# Patient Record
Sex: MALE | Race: WHITE | NOT HISPANIC OR LATINO | URBAN - METROPOLITAN AREA
[De-identification: names, ages, dates, MRNs, and addresses within clinical notes are randomized per-mention and may not be internally consistent; named-entity substitution may affect disease eponyms.]

---

## 2017-05-02 ENCOUNTER — EMERGENCY (EMERGENCY)
Facility: HOSPITAL | Age: 47
LOS: 1 days | Discharge: HOME | End: 2017-05-02
Attending: EMERGENCY MEDICINE | Admitting: EMERGENCY MEDICINE
Payer: SELF-PAY

## 2017-05-02 VITALS
OXYGEN SATURATION: 95 % | RESPIRATION RATE: 18 BRPM | DIASTOLIC BLOOD PRESSURE: 77 MMHG | TEMPERATURE: 97 F | SYSTOLIC BLOOD PRESSURE: 126 MMHG | HEART RATE: 92 BPM

## 2017-05-02 DIAGNOSIS — M79.661 PAIN IN RIGHT LOWER LEG: ICD-10-CM

## 2017-05-02 DIAGNOSIS — M79.662 PAIN IN LEFT LOWER LEG: ICD-10-CM

## 2017-05-02 DIAGNOSIS — F10.129 ALCOHOL ABUSE WITH INTOXICATION, UNSPECIFIED: ICD-10-CM

## 2017-05-02 DIAGNOSIS — R41.82 ALTERED MENTAL STATUS, UNSPECIFIED: ICD-10-CM

## 2017-05-02 PROCEDURE — 99283 EMERGENCY DEPT VISIT LOW MDM: CPT

## 2017-05-02 NOTE — ED PROVIDER NOTE - MEDICAL DECISION MAKING DETAILS
Alcohol intoxication, no signs of trauma. Alcohol intoxication, no signs of trauma.  Observed pt and re-evaluate.

## 2017-05-02 NOTE — ED PROVIDER NOTE - NEUROLOGICAL, MLM
Alert and oriented, no focal deficits, no motor or sensory deficits. Moves all four extremities equally.

## 2017-05-02 NOTE — ED ADULT TRIAGE NOTE - CHIEF COMPLAINT QUOTE
Pt. brought in from [facility] reports having bilateral leg pain [id number],. pt. reports he is unable to walk.

## 2017-05-02 NOTE — ED PROVIDER NOTE - OBJECTIVE STATEMENT
47y M Pt MICHAEL from [facility] for [facility]. Pt is fully alert and admits to daily ETOH use including today. Pt c/o bilateral leg pain from being on his feet too much and requests to put feet up at this time. 47y M Pt MICHAEL from [facility] for [facility]. Pt is fully alert and admits to daily ETOH use including today. Pt c/o bilateral leg pain from being on his feet too much and requests to put feet up at this time.  Denies any trauma, assault, or other complaints.

## 2017-05-02 NOTE — ED PROVIDER NOTE - NS ED MD SCRIBE ATTENDING SCRIBE SECTIONS
PHYSICAL EXAM/REVIEW OF SYSTEMS/PAST MEDICAL/SURGICAL/SOCIAL HISTORY/HISTORY OF PRESENT ILLNESS/HIV/VITAL SIGNS( Pullset)/INTAKE ASSESSMENT/SCREENINGS

## 2017-05-02 NOTE — ED PROVIDER NOTE - PROGRESS NOTE DETAILS
Pt is requesting discharge at this time and is ready to go. Pt is requesting discharge at this time and is ready to go.  Walks with steady gait and is still A&O x 3.  Repeat exam is benign.

## 2017-09-16 ENCOUNTER — EMERGENCY (EMERGENCY)
Facility: HOSPITAL | Age: 47
LOS: 1 days | Discharge: HOME | End: 2017-09-16
Admitting: EMERGENCY MEDICINE
Payer: MEDICAID

## 2017-09-16 VITALS
HEART RATE: 75 BPM | OXYGEN SATURATION: 94 % | RESPIRATION RATE: 18 BRPM | TEMPERATURE: 98 F | DIASTOLIC BLOOD PRESSURE: 58 MMHG | SYSTOLIC BLOOD PRESSURE: 98 MMHG

## 2017-09-16 DIAGNOSIS — F17.200 NICOTINE DEPENDENCE, UNSPECIFIED, UNCOMPLICATED: ICD-10-CM

## 2017-09-16 DIAGNOSIS — M79.661 PAIN IN RIGHT LOWER LEG: ICD-10-CM

## 2017-09-16 DIAGNOSIS — M79.672 PAIN IN LEFT FOOT: ICD-10-CM

## 2017-09-16 DIAGNOSIS — M79.671 PAIN IN RIGHT FOOT: ICD-10-CM

## 2017-09-16 PROCEDURE — 99283 EMERGENCY DEPT VISIT LOW MDM: CPT | Mod: 25

## 2017-09-16 RX ORDER — KETOROLAC TROMETHAMINE 30 MG/ML
30 SYRINGE (ML) INJECTION ONCE
Refills: 0 | Status: DISCONTINUED | OUTPATIENT
Start: 2017-09-16 | End: 2017-09-16

## 2017-09-16 RX ADMIN — Medication 30 MILLIGRAM(S): at 23:45

## 2017-09-16 NOTE — ED PROVIDER NOTE - MUSCULOSKELETAL, MLM
rt /lt foot + callouses, no swelling, symmetrical , no Ow, no deformity, + pulse, no  erythema, not warm to touch, Spine appears normal, range of motion is not limited, no muscle or joint tenderness

## 2017-09-16 NOTE — ED PROVIDER NOTE - CHPI ED SYMPTOMS NEG
no abrasion/no back pain/no deformity/no fever/no numbness/no stiffness/no tingling/no weakness/no bruising/no difficulty bearing weight

## 2017-09-16 NOTE — ED PROVIDER NOTE - OBJECTIVE STATEMENT
pt. h/o polysubstance abuse, homeless, presenting with b/l pain to  feet, pt. states has callouses and need them to be removed. states causing him pain when walking. no fever/chills, no swelling.

## 2017-09-16 NOTE — ED ADULT NURSE NOTE - OBJECTIVE STATEMENT
Pt. reports having bilateral leg pain after walking for too long, pt. also requesting a sandwich on assessment.

## 2018-02-11 ENCOUNTER — EMERGENCY (EMERGENCY)
Facility: HOSPITAL | Age: 48
LOS: 1 days | Discharge: HOME | End: 2018-02-11
Attending: EMERGENCY MEDICINE | Admitting: EMERGENCY MEDICINE
Payer: MEDICAID

## 2018-02-11 VITALS
DIASTOLIC BLOOD PRESSURE: 62 MMHG | OXYGEN SATURATION: 99 % | TEMPERATURE: 98 F | RESPIRATION RATE: 16 BRPM | HEART RATE: 78 BPM | SYSTOLIC BLOOD PRESSURE: 102 MMHG

## 2018-02-11 VITALS
DIASTOLIC BLOOD PRESSURE: 65 MMHG | RESPIRATION RATE: 16 BRPM | HEART RATE: 65 BPM | OXYGEN SATURATION: 97 % | TEMPERATURE: 98 F | SYSTOLIC BLOOD PRESSURE: 100 MMHG

## 2018-02-11 DIAGNOSIS — E11.65 TYPE 2 DIABETES MELLITUS WITH HYPERGLYCEMIA: ICD-10-CM

## 2018-02-11 DIAGNOSIS — R73.9 HYPERGLYCEMIA, UNSPECIFIED: ICD-10-CM

## 2018-02-11 LAB
ALBUMIN SERPL ELPH-MCNC: 3.3 G/DL — LOW (ref 3.4–5)
ALP SERPL-CCNC: 156 U/L — HIGH (ref 40–120)
ALT FLD-CCNC: 45 U/L — HIGH (ref 12–42)
AMPHET UR-MCNC: NEGATIVE — SIGNIFICANT CHANGE UP
ANION GAP SERPL CALC-SCNC: 7 MMOL/L — LOW (ref 9–16)
ANISOCYTOSIS BLD QL: SLIGHT — SIGNIFICANT CHANGE UP
APPEARANCE UR: CLEAR — SIGNIFICANT CHANGE UP
AST SERPL-CCNC: 73 U/L — HIGH (ref 15–37)
BARBITURATES UR SCN-MCNC: NEGATIVE — SIGNIFICANT CHANGE UP
BENZODIAZ UR-MCNC: NEGATIVE — SIGNIFICANT CHANGE UP
BILIRUB SERPL-MCNC: 0.9 MG/DL — SIGNIFICANT CHANGE UP (ref 0.2–1.2)
BILIRUB UR-MCNC: NEGATIVE — SIGNIFICANT CHANGE UP
BUN SERPL-MCNC: 10 MG/DL — SIGNIFICANT CHANGE UP (ref 7–23)
CALCIUM SERPL-MCNC: 9.3 MG/DL — SIGNIFICANT CHANGE UP (ref 8.5–10.5)
CHLORIDE SERPL-SCNC: 104 MMOL/L — SIGNIFICANT CHANGE UP (ref 96–108)
CO2 SERPL-SCNC: 26 MMOL/L — SIGNIFICANT CHANGE UP (ref 22–31)
COCAINE METAB.OTHER UR-MCNC: NEGATIVE — SIGNIFICANT CHANGE UP
COLOR SPEC: YELLOW — SIGNIFICANT CHANGE UP
CREAT SERPL-MCNC: 0.58 MG/DL — SIGNIFICANT CHANGE UP (ref 0.5–1.3)
DIFF PNL FLD: NEGATIVE — SIGNIFICANT CHANGE UP
EOSINOPHIL NFR BLD AUTO: 4 % — SIGNIFICANT CHANGE UP (ref 0–6)
ETHANOL SERPL-MCNC: <3 MG/DL — SIGNIFICANT CHANGE UP
GLUCOSE BLDC GLUCOMTR-MCNC: 204 MG/DL — HIGH (ref 70–99)
GLUCOSE SERPL-MCNC: 271 MG/DL — HIGH (ref 70–99)
GLUCOSE UR QL: >=1000
HCT VFR BLD CALC: 29.7 % — LOW (ref 39–50)
HGB BLD-MCNC: 8.4 G/DL — LOW (ref 13–17)
HYPOCHROMIA BLD QL: SLIGHT — SIGNIFICANT CHANGE UP
KETONES UR-MCNC: NEGATIVE — SIGNIFICANT CHANGE UP
LACTATE SERPL-SCNC: 0.8 MMOL/L — SIGNIFICANT CHANGE UP (ref 0.4–2)
LEUKOCYTE ESTERASE UR-ACNC: NEGATIVE — SIGNIFICANT CHANGE UP
LG PLATELETS BLD QL AUTO: PRESENT — SIGNIFICANT CHANGE UP
LIDOCAIN IGE QN: 207 U/L — SIGNIFICANT CHANGE UP (ref 73–393)
LYMPHOCYTES # BLD AUTO: 34 % — SIGNIFICANT CHANGE UP (ref 13–44)
MACROCYTES BLD QL: SLIGHT — SIGNIFICANT CHANGE UP
MCHC RBC-ENTMCNC: 20.1 PG — LOW (ref 27–34)
MCHC RBC-ENTMCNC: 28.3 G/DL — LOW (ref 32–36)
MCV RBC AUTO: 71.2 FL — LOW (ref 80–100)
METHADONE UR-MCNC: NEGATIVE — SIGNIFICANT CHANGE UP
MONOCYTES NFR BLD AUTO: 21 % — HIGH (ref 2–14)
NEUTROPHILS NFR BLD AUTO: 35 % — LOW (ref 43–77)
NEUTS BAND # BLD: 2 % — SIGNIFICANT CHANGE UP
NITRITE UR-MCNC: NEGATIVE — SIGNIFICANT CHANGE UP
OPIATES UR-MCNC: NEGATIVE — SIGNIFICANT CHANGE UP
PCO2 BLDV: 35 MMHG — LOW (ref 41–51)
PCP SPEC-MCNC: SIGNIFICANT CHANGE UP
PCP UR-MCNC: NEGATIVE — SIGNIFICANT CHANGE UP
PH BLDV: 7.5 — HIGH (ref 7.32–7.43)
PH UR: 7 — SIGNIFICANT CHANGE UP (ref 5–8)
PLAT MORPH BLD: (no result)
PLATELET # BLD AUTO: 105 K/UL — LOW (ref 150–400)
PO2 BLDV: 57 MMHG — HIGH (ref 35–40)
POLYCHROMASIA BLD QL SMEAR: SLIGHT — SIGNIFICANT CHANGE UP
POTASSIUM SERPL-MCNC: 4.1 MMOL/L — SIGNIFICANT CHANGE UP (ref 3.5–5.3)
POTASSIUM SERPL-SCNC: 4.1 MMOL/L — SIGNIFICANT CHANGE UP (ref 3.5–5.3)
PROT SERPL-MCNC: 7.7 G/DL — SIGNIFICANT CHANGE UP (ref 6.4–8.2)
PROT UR-MCNC: NEGATIVE MG/DL — SIGNIFICANT CHANGE UP
RBC # BLD: 4.17 M/UL — LOW (ref 4.2–5.8)
RBC # FLD: 24.1 % — HIGH (ref 10.3–16.9)
RBC BLD AUTO: (no result)
SAO2 % BLDV: 89 % — SIGNIFICANT CHANGE UP
SODIUM SERPL-SCNC: 137 MMOL/L — SIGNIFICANT CHANGE UP (ref 132–145)
SP GR SPEC: 1.01 — SIGNIFICANT CHANGE UP (ref 1–1.03)
THC UR QL: NEGATIVE — SIGNIFICANT CHANGE UP
UROBILINOGEN FLD QL: 0.2 E.U./DL — SIGNIFICANT CHANGE UP
VARIANT LYMPHS # BLD: 4 % — SIGNIFICANT CHANGE UP
WBC # BLD: 3.2 K/UL — LOW (ref 3.8–10.5)
WBC # FLD AUTO: 3.2 K/UL — LOW (ref 3.8–10.5)

## 2018-02-11 PROCEDURE — 70450 CT HEAD/BRAIN W/O DYE: CPT | Mod: 26

## 2018-02-11 PROCEDURE — 99220: CPT | Mod: 25

## 2018-02-11 RX ORDER — HALOPERIDOL DECANOATE 100 MG/ML
5 INJECTION INTRAMUSCULAR ONCE
Refills: 0 | Status: DISCONTINUED | OUTPATIENT
Start: 2018-02-11 | End: 2018-02-11

## 2018-02-11 RX ORDER — INSULIN HUMAN 100 [IU]/ML
4 INJECTION, SOLUTION SUBCUTANEOUS ONCE
Refills: 0 | Status: COMPLETED | OUTPATIENT
Start: 2018-02-11 | End: 2018-02-11

## 2018-02-11 RX ORDER — HALOPERIDOL DECANOATE 100 MG/ML
5 INJECTION INTRAMUSCULAR ONCE
Refills: 0 | Status: COMPLETED | OUTPATIENT
Start: 2018-02-11 | End: 2018-02-11

## 2018-02-11 RX ORDER — SODIUM CHLORIDE 9 MG/ML
1000 INJECTION INTRAMUSCULAR; INTRAVENOUS; SUBCUTANEOUS ONCE
Refills: 0 | Status: DISCONTINUED | OUTPATIENT
Start: 2018-02-11 | End: 2018-02-11

## 2018-02-11 RX ORDER — INSULIN HUMAN 100 [IU]/ML
4 INJECTION, SOLUTION SUBCUTANEOUS ONCE
Refills: 0 | Status: DISCONTINUED | OUTPATIENT
Start: 2018-02-11 | End: 2018-02-11

## 2018-02-11 RX ORDER — SODIUM CHLORIDE 9 MG/ML
1000 INJECTION INTRAMUSCULAR; INTRAVENOUS; SUBCUTANEOUS ONCE
Refills: 0 | Status: COMPLETED | OUTPATIENT
Start: 2018-02-11 | End: 2018-02-11

## 2018-02-11 RX ADMIN — SODIUM CHLORIDE 199.34 MILLILITER(S): 9 INJECTION INTRAMUSCULAR; INTRAVENOUS; SUBCUTANEOUS at 19:52

## 2018-02-11 RX ADMIN — INSULIN HUMAN 4 UNIT(S): 100 INJECTION, SOLUTION SUBCUTANEOUS at 19:54

## 2018-02-11 RX ADMIN — Medication 2 MILLIGRAM(S): at 19:02

## 2018-02-11 RX ADMIN — HALOPERIDOL DECANOATE 5 MILLIGRAM(S): 100 INJECTION INTRAMUSCULAR at 19:02

## 2018-02-11 NOTE — ED ADULT TRIAGE NOTE - CHIEF COMPLAINT QUOTE
hyperglycemia and uncooperative , [facility] called EMS hyperglycemia and uncooperative , [facility] called EMS escorted by GEOVANNY

## 2018-02-11 NOTE — ED ADULT NURSE NOTE - OBJECTIVE STATEMENT
sent from  [facility] for elevated blood glucose, patient recently discharged from [facility]

## 2018-02-11 NOTE — ED PROVIDER NOTE - UNABLE TO OBTAIN
Unresponsive Unable to obtain HPI due to patient being very sleepy and slightly aggressive Unable to obtain complete ROS due to patient being very sleepy and slightly aggressive.

## 2018-02-11 NOTE — ED PROVIDER NOTE - PROGRESS NOTE DETAILS
pt refusing all diagnostics and treatment, unclear if mental status had to do with drug use vs baseline psych disorder, pt seems very angry and is aggressive towards staff. no hallucinations. Had to be sedated to get work up. Anemia - most likely chronic, refused rectal exam, HCT unremarkable, labs show hyperglycemia but no signs of dka. When wakes up from sedation to be discharged Spoke to Chetna from [facility], states that pt's behavior description is similar to his baseline. They will try to coordinate trasnport for him in AM. Accepted pt from day attending, Dr. Willams.  Pt's d/c cancelled and placed on observation.  pt is sedated and not ready for discharge.  Will keep on observation to ensure he metabolizes appropriately.

## 2018-02-11 NOTE — ED PROVIDER NOTE - OBJECTIVE STATEMENT
47 y/o male with PMHx of insulin-dependent diabetes presents to ED c/o hyperglycemia. Patient is aggressive while at the ED, and repeating that he does not want to go to the hospital or to the store. He is very sleepy and does not provide history. Denies taking methadone or drinking alcohol today. HPI limited due to patient being very sleepy and slightly aggressive, as well as unwilling to fully cooperate. 49 y/o male with PMHx  diabetes presents to ED c/o hyperglycemia. Patient is aggressive while at the ED, and repeating that he does not want to go to the hospital or to the store. He is somnolent and does not provide history. Denies taking methadone or drinking alcohol today. HPI limited due to patient being very somnolent and slightly aggressive, as well as unwilling to fully cooperate.

## 2018-02-11 NOTE — ED PROVIDER NOTE - CONSTITUTIONAL, MLM
normal... Patient appears very sleepy, slightly aggressive. Patient appears somnolent at times, then wakes up and becomes aggressive/agitated

## 2018-02-12 ENCOUNTER — EMERGENCY (EMERGENCY)
Facility: HOSPITAL | Age: 48
LOS: 1 days | End: 2018-02-12
Attending: EMERGENCY MEDICINE | Admitting: EMERGENCY MEDICINE
Payer: MEDICAID

## 2018-02-12 VITALS
RESPIRATION RATE: 18 BRPM | OXYGEN SATURATION: 98 % | SYSTOLIC BLOOD PRESSURE: 120 MMHG | DIASTOLIC BLOOD PRESSURE: 84 MMHG | HEART RATE: 84 BPM

## 2018-02-12 VITALS
TEMPERATURE: 98 F | RESPIRATION RATE: 16 BRPM | DIASTOLIC BLOOD PRESSURE: 70 MMHG | OXYGEN SATURATION: 98 % | HEART RATE: 67 BPM | SYSTOLIC BLOOD PRESSURE: 105 MMHG

## 2018-02-12 DIAGNOSIS — K72.90 HEPATIC FAILURE, UNSPECIFIED WITHOUT COMA: ICD-10-CM

## 2018-02-12 DIAGNOSIS — R73.9 HYPERGLYCEMIA, UNSPECIFIED: ICD-10-CM

## 2018-02-12 DIAGNOSIS — E11.9 TYPE 2 DIABETES MELLITUS WITHOUT COMPLICATIONS: ICD-10-CM

## 2018-02-12 LAB
AMMONIA BLD-MCNC: 123 UMOL/L — HIGH (ref 11–32)
APTT BLD: 41.7 SEC — HIGH (ref 27.5–36.5)
GLUCOSE BLDC GLUCOMTR-MCNC: 180 MG/DL — HIGH (ref 70–99)
HIV 1 & 2 AB SERPL IA.RAPID: SIGNIFICANT CHANGE UP
INR BLD: 1.41 — HIGH (ref 0.88–1.16)
PROTHROM AB SERPL-ACNC: 15.6 SEC — HIGH (ref 9.8–12.7)

## 2018-02-12 PROCEDURE — 99285 EMERGENCY DEPT VISIT HI MDM: CPT | Mod: 25

## 2018-02-12 PROCEDURE — 93010 ELECTROCARDIOGRAM REPORT: CPT

## 2018-02-12 PROCEDURE — 71045 X-RAY EXAM CHEST 1 VIEW: CPT | Mod: 26

## 2018-02-12 RX ORDER — SODIUM CHLORIDE 9 MG/ML
1000 INJECTION, SOLUTION INTRAVENOUS
Refills: 0 | Status: DISCONTINUED | OUTPATIENT
Start: 2018-02-12 | End: 2018-02-16

## 2018-02-12 RX ORDER — HALOPERIDOL DECANOATE 100 MG/ML
5 INJECTION INTRAMUSCULAR ONCE
Refills: 0 | Status: DISCONTINUED | OUTPATIENT
Start: 2018-02-12 | End: 2018-02-16

## 2018-02-12 RX ORDER — SODIUM CHLORIDE 9 MG/ML
1000 INJECTION INTRAMUSCULAR; INTRAVENOUS; SUBCUTANEOUS ONCE
Refills: 0 | Status: COMPLETED | OUTPATIENT
Start: 2018-02-12 | End: 2018-02-12

## 2018-02-12 RX ORDER — INSULIN GLARGINE 100 [IU]/ML
15 INJECTION, SOLUTION SUBCUTANEOUS ONCE
Refills: 0 | Status: COMPLETED | OUTPATIENT
Start: 2018-02-12 | End: 2018-02-12

## 2018-02-12 RX ORDER — HALOPERIDOL DECANOATE 100 MG/ML
5 INJECTION INTRAMUSCULAR ONCE
Refills: 0 | Status: COMPLETED | OUTPATIENT
Start: 2018-02-12 | End: 2018-02-12

## 2018-02-12 RX ADMIN — SODIUM CHLORIDE 1000 MILLILITER(S): 9 INJECTION INTRAMUSCULAR; INTRAVENOUS; SUBCUTANEOUS at 14:06

## 2018-02-12 RX ADMIN — INSULIN GLARGINE 15 UNIT(S): 100 INJECTION, SOLUTION SUBCUTANEOUS at 14:24

## 2018-02-12 RX ADMIN — SODIUM CHLORIDE 125 MILLILITER(S): 9 INJECTION, SOLUTION INTRAVENOUS at 21:23

## 2018-02-12 RX ADMIN — HALOPERIDOL DECANOATE 5 MILLIGRAM(S): 100 INJECTION INTRAMUSCULAR at 11:08

## 2018-02-12 RX ADMIN — Medication 2 MILLIGRAM(S): at 12:38

## 2018-02-12 NOTE — ED CDU PROVIDER INITIAL DAY NOTE - OBJECTIVE STATEMENT
49 y/o male with PMHx  diabetes presents to ED c/o hyperglycemia. Patient is aggressive while at the ED, and repeating that he does not want to go to the hospital or to the store. He is somnolent and does not provide history. Denies taking methadone or drinking alcohol today. HPI limited due to patient being very somnolent and slightly aggressive, as well as unwilling to fully cooperate.

## 2018-02-12 NOTE — ED ADULT NURSE NOTE - OBJECTIVE STATEMENT
pt. here for social admit. aggressive and uncooperative with staff. unable to get information from patient.

## 2018-02-12 NOTE — ED PROVIDER NOTE - PROGRESS NOTE DETAILS
received sign out from Dr. Sanchez at 815 am.  Patient had been calm and cooperative until now.  Keeps stating "leave me along"  Trying to hit staff and leave.  Gait is unsteady, deconditioned.  Spoke with our SW to try to get some collateral on patient.  No next of kin contact information.  May need social admission for workup/placement.  DUS, alcohol negative.  Pancytopenic.  HIV, EKG, CXR, repeat FS ordered received sign out from Dr. Sanchez at 815 am.  Patient had been calm and cooperative until now.  Keeps stating "leave me along"  Trying to hit staff and leave.  Gait is unsteady, deconditioned.  Spoke with our SW to try to get some collateral on patient.  No next of kin contact information.  May need social admission for workup/placement.  DUS, alcohol negative.  Pancytopenic.  HIV, EKG, CXR, repeat FS ordered.  The  did some research: inpatient restricted to [facility], has no home address.  Contacted [facility] transfer center for transfer Spoke with Dr. Covarrubias from [facility] treatment center.  More collateral:  Hepatic encephalopathy non compliant with lactulose, pancreatic failure on creon, lantus for DM (15 u am, 45 u pm).  After speaking with her I ordered and ammonia level which came back elevated to 123.  Patient had already received haldol and ativan for sedation.  Plan to admit (stage I encephalopathy) and given lactulose rectally Spoke with Dr. Chen Given rectal lactulose via retention enema with two large bowel movements.  Also given day dose of lantus. Accept sign out from Dr. Edward.  Pt awaiting bed at .  Pt with hepatic encephalopathy.  Pt has been calm over the past few hours.  Will re-check FS at 9pm and treat with sliding scale. FS - 241.  Pt completely calm and cooperative.  Will d/c 1:1 but keep on close observation.  Pt just next to nursing station.  Will keep on maintenance fluid.

## 2018-02-12 NOTE — ED CDU PROVIDER INITIAL DAY NOTE - PROGRESS NOTE DETAILS
Spoke to Chetna from [facility], states that pt's behavior description is similar to his baseline. They will try to coordinate trasnport for him in AM. Pt fully awake and alert.  Able to walk and can tell us where he lives and how he will get there.  no complaints on discharge.

## 2018-02-12 NOTE — ED CDU PROVIDER INITIAL DAY NOTE - UNABLE TO OBTAIN
Unable to obtain complete ROS due to patient being very sleepy and slightly aggressive. Unresponsive Unable to obtain HPI due to patient being very sleepy and slightly aggressive

## 2018-02-12 NOTE — ED ADULT NURSE NOTE - CHIEF COMPLAINT QUOTE
Pt from [facility], agitated and uncooperative with recent discharge here yesterday for hyperglycemia.

## 2018-02-12 NOTE — ED ADULT NURSE REASSESSMENT NOTE - NS ED NURSE REASSESS COMMENT FT1
Patient uncooperative with care and refusing medication administration, medicated with ativan as ordered. On contious cardiac monitor. Insulin given, repeat finger stick due at 9pm. Will endorse to upcoming Rn
patient combative in room; not wanting to leave; screams "leave me alone"; ripped IV out; tries to hit people when approached;  after recheck; VSS; MD Tocco asking for patient to be discharged; not cooperative
spoke with ALEXA Madison from [facility]; explained to nurse that patient was to be discharged back to [facility]; was medically cleared; given new clothes; no cane or walker used; nto wheelchair bound; ALEXA Krishna wanted us to transfer the patient to [facility] "for detox services as he is not really right for us to take care of."; spoke with MD Sanchez on same phone call and MD explained that that was against EMTALA laws. IT was explained to ALEXA Krishna that patient was medically cleared at this time and did not qualify for transfer to another facility; was told a car service was being set up for transport back to [facility] and if they did not feel that they could take care of him for psych issues/ or medical issues they would have to call 911 ot transport to an appropriate facility
patient will be delay DC in morning; spoke with RN Chetna from [facility] and nurse concerned about transport to [facility] from ED; pauletteetn still sleeping at this time; was sedated on earlier shift; no way to transport patient from ED to facility; Chetna spoke with MD Willams and patient to be delay dc in morning; if patient is to wake up from sedation and is combative in any way, staff will allow patient to leave

## 2018-02-12 NOTE — ED PROVIDER NOTE - PHYSICAL EXAMINATION
VITAL SIGNS: I have reviewed nursing notes and confirm.  CONSTITUTIONAL: Pt standing against wall, stares at floor, easily agitated, refusing to be touched, urinated on himself.  SKIN: Skin is warm and dry, no acute rash.  HEAD: Normocephalic; atraumatic.  EYES: PERRL, EOM intact; conjunctiva and sclera clear.  ENT: No nasal discharge; airway clear.  NECK: Supple; non tender.  CARD: S1, S2 normal; no murmurs, gallops, or rubs. Regular rate and rhythm.  RESP: No wheezes, rales or rhonchi.  ABD: Normal bowel sounds; soft; non-distended; non-tender; no hepatosplenomegaly.  EXT: Normal ROM. No clubbing, cyanosis or edema.  NEURO: Alert.  Refusing to answer questions.  Moves all extremities equally.

## 2018-02-12 NOTE — ED BEHAVIORAL HEALTH NOTE - BEHAVIORAL HEALTH NOTE
Sw was consulted to the Ed for assistance with a patient. The patient is 48 year old homeless man. The patient has no extended family to knowledge and was currently residing at the [facility]. The patient presents as disoriented, confused, agitated and aggressive with the staff. He is either refusing to or can not answer questions regarding to his current situation or past. Per the notes the patient was sent over from the [facility] for agitation and aggressive behavior and they do not want to accept him back as they fell he is an inappropriate fit for their place and feel he would be better suited for an medical admission or inpatient detox.  Per Mehdi the patient does have active medicaid with [employer] ([phone]) but it states that he is restricted for inpatient services to [facility] ([address] [place] [id number]- [phone]) for out patient services and the  [address] [place] [phone] [phone].    The Patients insurance was contacted to see if the patient has a assigned case manger but they were unable to provide me with any information at this point in time. The [facility] ( [phone]) was also contacted for more information regarding the patient, but all they could provide was that the patient was homeless and it was not recommend that he return. They could ot provide any other information at this time. Worker to continue to provide assistance.

## 2018-02-12 NOTE — ED PROVIDER NOTE - OBJECTIVE STATEMENT
Pt is a 49yo M with a h/o DM who was here yesterday for hyperglycemia.  Pt was originally sent in from [facility] and sent for hyperglycemia and agitated behavior.  Pt required chemical sedation for cooperation with work up.  W/U negative for DKA but showed anemia and elevated LFTs.  Hyperglycemia improved (ultimately FS came down to 180).  Pt remained on observation with plan to d/c back to [facility] once he metabolized sedation.  Pt metabolized appropriately and was able to dress himself and stand/walk.  Pt discharged but upon calling [facility] for transport they report that pt shouldn't return to them as he is inappropriate for their rehab given mental capacity and behavior.  They believe pt either needs medical admission or inpatient detox.  Pt is calm unless you attempt to talk to him or examine him.  He becomes easily agitated and aggressive.  Pt standing in exam area and refuses to sit. Pt is a 47yo M with a h/o DM, PSA, and homlessness who was here yesterday for hyperglycemia.  Pt was originally sent in from [facility] and sent for hyperglycemia and agitated behavior.  Pt required chemical sedation for cooperation with work up.  W/U negative for DKA but showed anemia and elevated LFTs.  Hyperglycemia improved (ultimately FS came down to 180).  Pt remained on observation with plan to d/c back to [facility] once he metabolized sedation.  Pt metabolized appropriately and was able to dress himself and stand/walk.  Pt discharged but upon calling [facility] for transport they report that pt shouldn't return to them as he is inappropriate for their rehab given mental capacity and behavior.  They believe pt either needs medical admission or inpatient detox.  Pt is calm unless you attempt to talk to him or examine him.  He becomes easily agitated and aggressive.  Pt standing in exam area and refuses to sit or answer questions.

## 2018-02-12 NOTE — ED ADULT NURSE REASSESSMENT NOTE - COMFORT CARE
darkened lights/plan of care explained/wait time explained
darkened lights/repositioned/warm blanket provided

## 2018-02-12 NOTE — ED ADULT NURSE REASSESSMENT NOTE - NS ED NURSE REASSESS COMMENT FT1
[facility] transfer center called, spoke to Jay. no bed currently.
patient refused EKG and blood work. Patient still slightly agitated.
Patient is awake and alert. Becoming aggressive and agitated and wanting to leave. MD Edward notified. Verbal order of 5mg Halidol to be given IM.
patient resting on hospital stretcher. on 1:1 watch, being watched by a PCT. pt. has no line, as per previous RN, patient ripped out IV on previous shift. MD Sanchez aware pt. has no IV line currently.
pt had large bowel movement, cleaned and repositioned for comfort.
Sue and Chuck from [facility] called for an update for patient. Aware patient is awaiting tx and bed at .
Received patient from change of shift. Patient in NAD, sleeping comfortably, and will continue to monitor.

## 2018-02-12 NOTE — ED CDU PROVIDER INITIAL DAY NOTE - DETAILS
Pt required sedation for w/u and now clinically stable but still sedated.  Will perform serial neuro checks to ensure he metabolizes appropriately.

## 2018-02-12 NOTE — ED CDU PROVIDER DISPOSITION NOTE - CLINICAL COURSE
Pt with hyperglycemia - corrected.  Sedated initially for aggressive behavior.  Metabolized appropriately and stable for d/c.

## 2018-02-12 NOTE — ED ADULT TRIAGE NOTE - CHIEF COMPLAINT QUOTE
Pt from [facility], agitated and uncooperative with recent discharge here yesterday for hyperglycemia.

## 2021-07-20 NOTE — ED ADULT NURSE NOTE - NS ED NURSE LEVEL OF CONSCIOUSNESS AFFECT
Refill Request     Last Seen: Last Seen Department: 4/21/2021  Last Seen by PCP: 3/15/2021    Last Written: 3/15/2021    Next Appointment:   No future appointments.     Please advise when patient is due for an appointment      Requested Prescriptions     Pending Prescriptions Disp Refills    Azelastine-Fluticasone 137-50 MCG/ACT SUSP [Pharmacy Med Name: AZELASTIN-FLUTIC 137-50MCG SPR]  1     Sig: SPRAY 1 SPRAY INTO EACH NOSTRIL EVERY DAY
Calm

## 2021-11-02 NOTE — ED ADULT NURSE NOTE - CAS DISCH TRANSFER METHOD
car service to [facility]/Transportation service I performed a history and physical exam of the patient and discussed their management with the advanced care provider. I reviewed the advanced care provider's note and agree with the documented findings and plan of care. My medical decision making and objective findings are found above.

## 2022-05-10 NOTE — ED PROVIDER NOTE - TEMPLATE, MLM
Pt had diarrhea, nausea, and abdominal pain  bp was 130  Likely starting with withdrawal from opioids   Will restart ms contin 15mg bid and continue morphine prn at decreased dose of 5mg prn q6 hours General

## 2022-08-22 NOTE — ED ADULT NURSE NOTE - GENITOURINARY WDL
Detail Level: Detailed
Quality 130: Documentation Of Current Medications In The Medical Record: Current Medications Documented
Bladder non-tender and non-distended. Urine clear yellow.